# Patient Record
Sex: MALE | Race: WHITE | NOT HISPANIC OR LATINO | Employment: FULL TIME | URBAN - METROPOLITAN AREA
[De-identification: names, ages, dates, MRNs, and addresses within clinical notes are randomized per-mention and may not be internally consistent; named-entity substitution may affect disease eponyms.]

---

## 2025-06-27 ENCOUNTER — HOSPITAL ENCOUNTER (EMERGENCY)
Facility: HOSPITAL | Age: 25
Discharge: HOME | End: 2025-06-27
Payer: COMMERCIAL

## 2025-06-27 ENCOUNTER — APPOINTMENT (OUTPATIENT)
Dept: RADIOLOGY | Facility: HOSPITAL | Age: 25
End: 2025-06-27
Payer: COMMERCIAL

## 2025-06-27 VITALS
WEIGHT: 225 LBS | RESPIRATION RATE: 16 BRPM | SYSTOLIC BLOOD PRESSURE: 116 MMHG | BODY MASS INDEX: 32.21 KG/M2 | OXYGEN SATURATION: 99 % | HEIGHT: 70 IN | HEART RATE: 63 BPM | DIASTOLIC BLOOD PRESSURE: 66 MMHG | TEMPERATURE: 97 F

## 2025-06-27 DIAGNOSIS — R11.2 NAUSEA VOMITING AND DIARRHEA: Primary | ICD-10-CM

## 2025-06-27 DIAGNOSIS — K52.9 ENTEROCOLITIS: ICD-10-CM

## 2025-06-27 DIAGNOSIS — R19.7 NAUSEA VOMITING AND DIARRHEA: Primary | ICD-10-CM

## 2025-06-27 LAB
ALBUMIN SERPL BCP-MCNC: 4.6 G/DL (ref 3.4–5)
ALP SERPL-CCNC: 64 U/L (ref 33–120)
ALT SERPL W P-5'-P-CCNC: 24 U/L (ref 10–52)
ANION GAP SERPL CALC-SCNC: 14 MMOL/L (ref 10–20)
AST SERPL W P-5'-P-CCNC: 17 U/L (ref 9–39)
BASOPHILS # BLD AUTO: 0.07 X10*3/UL (ref 0–0.1)
BASOPHILS NFR BLD AUTO: 0.3 %
BILIRUB SERPL-MCNC: 0.8 MG/DL (ref 0–1.2)
BUN SERPL-MCNC: 18 MG/DL (ref 6–23)
CALCIUM SERPL-MCNC: 9.1 MG/DL (ref 8.6–10.3)
CHLORIDE SERPL-SCNC: 104 MMOL/L (ref 98–107)
CO2 SERPL-SCNC: 23 MMOL/L (ref 21–32)
CREAT SERPL-MCNC: 1.27 MG/DL (ref 0.5–1.3)
EGFRCR SERPLBLD CKD-EPI 2021: 80 ML/MIN/1.73M*2
EOSINOPHIL # BLD AUTO: 0.02 X10*3/UL (ref 0–0.7)
EOSINOPHIL NFR BLD AUTO: 0.1 %
ERYTHROCYTE [DISTWIDTH] IN BLOOD BY AUTOMATED COUNT: 12.9 % (ref 11.5–14.5)
GLUCOSE SERPL-MCNC: 154 MG/DL (ref 74–99)
HCT VFR BLD AUTO: 44.4 % (ref 41–52)
HGB BLD-MCNC: 15.7 G/DL (ref 13.5–17.5)
IMM GRANULOCYTES # BLD AUTO: 0.13 X10*3/UL (ref 0–0.7)
IMM GRANULOCYTES NFR BLD AUTO: 0.6 % (ref 0–0.9)
LACTATE SERPL-SCNC: 1.7 MMOL/L (ref 0.4–2)
LIPASE SERPL-CCNC: 18 U/L (ref 9–82)
LYMPHOCYTES # BLD AUTO: 2 X10*3/UL (ref 1.2–4.8)
LYMPHOCYTES NFR BLD AUTO: 8.7 %
MAGNESIUM SERPL-MCNC: 1.93 MG/DL (ref 1.6–2.4)
MCH RBC QN AUTO: 29.2 PG (ref 26–34)
MCHC RBC AUTO-ENTMCNC: 35.4 G/DL (ref 32–36)
MCV RBC AUTO: 83 FL (ref 80–100)
MONOCYTES # BLD AUTO: 1.27 X10*3/UL (ref 0.1–1)
MONOCYTES NFR BLD AUTO: 5.5 %
NEUTROPHILS # BLD AUTO: 19.48 X10*3/UL (ref 1.2–7.7)
NEUTROPHILS NFR BLD AUTO: 84.8 %
NRBC BLD-RTO: 0 /100 WBCS (ref 0–0)
PLATELET # BLD AUTO: 305 X10*3/UL (ref 150–450)
POTASSIUM SERPL-SCNC: 3.9 MMOL/L (ref 3.5–5.3)
PROT SERPL-MCNC: 7.8 G/DL (ref 6.4–8.2)
RBC # BLD AUTO: 5.38 X10*6/UL (ref 4.5–5.9)
SODIUM SERPL-SCNC: 137 MMOL/L (ref 136–145)
WBC # BLD AUTO: 23 X10*3/UL (ref 4.4–11.3)

## 2025-06-27 PROCEDURE — 74177 CT ABD & PELVIS W/CONTRAST: CPT | Performed by: RADIOLOGY

## 2025-06-27 PROCEDURE — 83605 ASSAY OF LACTIC ACID: CPT | Performed by: PHYSICIAN ASSISTANT

## 2025-06-27 PROCEDURE — 74177 CT ABD & PELVIS W/CONTRAST: CPT

## 2025-06-27 PROCEDURE — 96374 THER/PROPH/DIAG INJ IV PUSH: CPT | Mod: 59

## 2025-06-27 PROCEDURE — 99285 EMERGENCY DEPT VISIT HI MDM: CPT | Mod: 25

## 2025-06-27 PROCEDURE — 83690 ASSAY OF LIPASE: CPT | Performed by: PHYSICIAN ASSISTANT

## 2025-06-27 PROCEDURE — 83735 ASSAY OF MAGNESIUM: CPT | Performed by: PHYSICIAN ASSISTANT

## 2025-06-27 PROCEDURE — 96361 HYDRATE IV INFUSION ADD-ON: CPT

## 2025-06-27 PROCEDURE — 2550000001 HC RX 255 CONTRASTS: Performed by: PHYSICIAN ASSISTANT

## 2025-06-27 PROCEDURE — 96375 TX/PRO/DX INJ NEW DRUG ADDON: CPT

## 2025-06-27 PROCEDURE — 2500000004 HC RX 250 GENERAL PHARMACY W/ HCPCS (ALT 636 FOR OP/ED): Performed by: PHYSICIAN ASSISTANT

## 2025-06-27 PROCEDURE — 84075 ASSAY ALKALINE PHOSPHATASE: CPT | Performed by: PHYSICIAN ASSISTANT

## 2025-06-27 PROCEDURE — 85025 COMPLETE CBC W/AUTO DIFF WBC: CPT | Performed by: PHYSICIAN ASSISTANT

## 2025-06-27 RX ORDER — ONDANSETRON 4 MG/1
4 TABLET, ORALLY DISINTEGRATING ORAL EVERY 8 HOURS PRN
Qty: 20 TABLET | Refills: 0 | Status: SHIPPED | OUTPATIENT
Start: 2025-06-27 | End: 2025-07-04

## 2025-06-27 RX ORDER — KETOROLAC TROMETHAMINE 30 MG/ML
15 INJECTION, SOLUTION INTRAMUSCULAR; INTRAVENOUS ONCE
Status: DISCONTINUED | OUTPATIENT
Start: 2025-06-27 | End: 2025-06-28 | Stop reason: HOSPADM

## 2025-06-27 RX ORDER — DICYCLOMINE HYDROCHLORIDE 20 MG/1
20 TABLET ORAL 4 TIMES DAILY PRN
Qty: 20 TABLET | Refills: 0 | Status: SHIPPED | OUTPATIENT
Start: 2025-06-27 | End: 2025-07-02

## 2025-06-27 RX ORDER — FAMOTIDINE 10 MG/ML
20 INJECTION, SOLUTION INTRAVENOUS ONCE
Status: COMPLETED | OUTPATIENT
Start: 2025-06-27 | End: 2025-06-27

## 2025-06-27 RX ORDER — PROCHLORPERAZINE EDISYLATE 5 MG/ML
10 INJECTION INTRAMUSCULAR; INTRAVENOUS ONCE
Status: COMPLETED | OUTPATIENT
Start: 2025-06-27 | End: 2025-06-27

## 2025-06-27 RX ORDER — FAMOTIDINE 20 MG/1
20 TABLET, FILM COATED ORAL 2 TIMES DAILY
Qty: 30 TABLET | Refills: 0 | Status: SHIPPED | OUTPATIENT
Start: 2025-06-27 | End: 2025-07-12

## 2025-06-27 RX ORDER — NAPROXEN 500 MG/1
500 TABLET ORAL
Qty: 30 TABLET | Refills: 0 | Status: SHIPPED | OUTPATIENT
Start: 2025-06-27 | End: 2025-07-12

## 2025-06-27 RX ADMIN — SODIUM CHLORIDE, SODIUM LACTATE, POTASSIUM CHLORIDE, AND CALCIUM CHLORIDE 1000 ML: .6; .31; .03; .02 INJECTION, SOLUTION INTRAVENOUS at 20:23

## 2025-06-27 RX ADMIN — IOHEXOL 75 ML: 350 INJECTION, SOLUTION INTRAVENOUS at 21:14

## 2025-06-27 RX ADMIN — PROCHLORPERAZINE EDISYLATE 10 MG: 5 INJECTION INTRAMUSCULAR; INTRAVENOUS at 20:33

## 2025-06-27 RX ADMIN — FAMOTIDINE 20 MG: 10 INJECTION, SOLUTION INTRAVENOUS at 20:22

## 2025-06-27 ASSESSMENT — LIFESTYLE VARIABLES
TOTAL SCORE: 0
HAVE YOU EVER FELT YOU SHOULD CUT DOWN ON YOUR DRINKING: NO
HAVE PEOPLE ANNOYED YOU BY CRITICIZING YOUR DRINKING: NO
EVER HAD A DRINK FIRST THING IN THE MORNING TO STEADY YOUR NERVES TO GET RID OF A HANGOVER: NO
EVER FELT BAD OR GUILTY ABOUT YOUR DRINKING: NO

## 2025-06-27 ASSESSMENT — PAIN - FUNCTIONAL ASSESSMENT: PAIN_FUNCTIONAL_ASSESSMENT: 0-10

## 2025-06-27 ASSESSMENT — PAIN SCALES - GENERAL: PAINLEVEL_OUTOF10: 0 - NO PAIN

## 2025-06-28 NOTE — ED PROVIDER NOTES
HPI   Chief Complaint   Patient presents with   • Lethargy     Patient c/o vomiting, weakness and lightheaded beginning around 1700.       24-year-old male presents to the emergency room with chief complaint of sudden onset of nausea vomiting diarrhea with abdominal cramping.  Patient states he feels dehydrated.  Patient is unsure as to what started this episode.  He denies any fevers, chills, chest pain, palpitations, cough or shortness of breath.  Denies any history of diabetes, no history of marijuana use.  Denies any urinary symptoms.  He does report feeling dehydrated and weak after all the vomiting.      History provided by:  Patient, parent and medical records          Patient History   Medical History[1]  Surgical History[2]  Family History[3]  Social History[4]    Physical Exam   ED Triage Vitals [06/27/25 1949]   Temperature Heart Rate Respirations BP   36.1 °C (97 °F) 63 16 116/66      Pulse Ox Temp Source Heart Rate Source Patient Position   96 % Temporal Monitor Sitting      BP Location FiO2 (%)     Right arm --       Physical Exam  Vitals and nursing note reviewed.   Constitutional:       General: He is awake.      Appearance: He is well-developed and overweight. He is ill-appearing.      Comments: Actively vomiting in triage   HENT:      Head: Normocephalic and atraumatic.      Right Ear: Tympanic membrane, ear canal and external ear normal.      Left Ear: Tympanic membrane, ear canal and external ear normal.      Nose: Nose normal.      Mouth/Throat:      Mouth: Mucous membranes are moist.      Pharynx: Oropharynx is clear.   Eyes:      Extraocular Movements: Extraocular movements intact.      Conjunctiva/sclera: Conjunctivae normal.      Pupils: Pupils are equal, round, and reactive to light.   Cardiovascular:      Rate and Rhythm: Normal rate and regular rhythm.      Pulses: Normal pulses.      Heart sounds: Normal heart sounds.   Pulmonary:      Effort: Pulmonary effort is normal.      Breath  sounds: Normal breath sounds. No wheezing, rhonchi or rales.   Abdominal:      General: Abdomen is flat. Bowel sounds are normal.      Palpations: Abdomen is soft. There is no mass.      Tenderness: There is no abdominal tenderness. There is no guarding or rebound.      Hernia: No hernia is present.   Musculoskeletal:         General: No swelling or tenderness. Normal range of motion.      Cervical back: Normal range of motion and neck supple. No tenderness.   Skin:     General: Skin is warm and dry.      Capillary Refill: Capillary refill takes less than 2 seconds.      Findings: No rash.   Neurological:      General: No focal deficit present.      Mental Status: He is alert and oriented to person, place, and time. Mental status is at baseline.      GCS: GCS eye subscore is 4. GCS verbal subscore is 5. GCS motor subscore is 6.      Sensory: Sensation is intact.      Motor: Motor function is intact.      Coordination: Coordination is intact.      Gait: Gait is intact.   Psychiatric:         Mood and Affect: Mood normal.         Behavior: Behavior normal. Behavior is cooperative.         Thought Content: Thought content normal.         Judgment: Judgment normal.           ED Course & MDM   Diagnoses as of 06/27/25 2234   Nausea vomiting and diarrhea   Enterocolitis                 No data recorded     [place] Coma Scale Score: 15 (06/27/25 1954 : Aline Macdonald RN)                           Medical Decision Making  24-year-old male presents to the emergency room with chief complaint of sudden onset of nausea vomiting diarrhea with abdominal cramping.  Patient states he feels dehydrated.  Patient is unsure as to what started this episode.  He denies any fevers, chills, chest pain, palpitations, cough or shortness of breath.  Denies any history of diabetes, no history of marijuana use.  Denies any urinary symptoms.  He does report feeling dehydrated and weak after all the vomiting.  Temperature 36 1, heart  rate 63, respirations 16, BP is 116/66, pulse ox is 99% on room air  Patient was given a liter of LR, Compazine 10 mg IV push, Pepcid 20 mg IV push.  CBC white count 23, hemoglobin 15.7 [place] crit 44.4 platelet count 305, chemistry panel glucose 154 otherwise the metabolic panel was unremarkable, lipase 18, magnesium 1.93, lactic 1.7.  CT scan of the abdomen pelvis IV contrast showed a small amount of nonspecific fluid within multiple mid abdominal pelvic small bowel loops as well as small amount of fluid and formed stool intermittently throughout the colon correlate for possible enterocolitis.  Evidence for hepatic steatosis.  Normal appendix.  I discussed results of workup with the patient repeat exam the patient is feeling improvement after the IV fluids and medication.  He is able to tolerate oral fluids.  I do not suspect a surgical abdomen.  There is no sign to suggest appendicitis, diverticulitis, colitis, bowel obstruction, AAA, mesenteric ischemia, ileus.  He was discharged home with prescription for Zofran ODT, Bentyl, naproxen and Pepcid.  I discussed clear liquids for the next 24 hours, return precautions were discussed.  The patient verbalizes understanding agreement with the plan and disposition all questions answered prior to discharge        Procedure  Procedures           [1]  Past Medical History:  Diagnosis Date   • Other specified health status     No pertinent past medical history   [2]  History reviewed. No pertinent surgical history.  [3]  No family history on file.  [4]  Social History  Tobacco Use   • Smoking status: Never   • Smokeless tobacco: Never   Vaping Use   • Vaping status: Never Used   Substance Use Topics   • Alcohol use: Not on file   • Drug use: Never      Wu Sr PA-C  06/27/25 5139